# Patient Record
Sex: MALE | Race: AMERICAN INDIAN OR ALASKA NATIVE | NOT HISPANIC OR LATINO | Employment: FULL TIME | URBAN - METROPOLITAN AREA
[De-identification: names, ages, dates, MRNs, and addresses within clinical notes are randomized per-mention and may not be internally consistent; named-entity substitution may affect disease eponyms.]

---

## 2023-02-01 PROBLEM — E55.9 VITAMIN D INSUFFICIENCY: Status: ACTIVE | Noted: 2023-02-01

## 2023-02-01 PROBLEM — E29.1 HYPOGONADISM MALE: Status: ACTIVE | Noted: 2023-02-01

## 2023-02-01 PROBLEM — R69 TAKING MEDICATION FOR CHRONIC DISEASE: Status: ACTIVE | Noted: 2023-02-01

## 2023-02-01 PROBLEM — E78.5 DYSLIPIDEMIA: Status: ACTIVE | Noted: 2023-02-01

## 2023-02-01 PROBLEM — E11.9 DIABETES MELLITUS (MULTI): Status: ACTIVE | Noted: 2023-02-01

## 2023-02-01 PROBLEM — R10.11 ABDOMINAL PAIN, RUQ (RIGHT UPPER QUADRANT): Status: ACTIVE | Noted: 2023-02-01

## 2023-02-01 PROBLEM — L29.9 PRURITUS: Status: ACTIVE | Noted: 2023-02-01

## 2023-02-01 PROBLEM — R79.89 LOW TESTOSTERONE: Status: ACTIVE | Noted: 2023-02-01

## 2023-02-01 PROBLEM — R07.89 CHEST WALL PAIN: Status: ACTIVE | Noted: 2023-02-01

## 2023-02-01 PROBLEM — E66.9 OBESITY: Status: ACTIVE | Noted: 2023-02-01

## 2023-02-01 PROBLEM — E66.9 OBESITY (BMI 30-39.9): Status: ACTIVE | Noted: 2023-02-01

## 2023-02-01 PROBLEM — N52.9 MALE ERECTILE DISORDER: Status: ACTIVE | Noted: 2023-02-01

## 2023-02-01 PROBLEM — R21 LOCALIZED RASH: Status: ACTIVE | Noted: 2023-02-01

## 2023-02-01 RX ORDER — CHOLECALCIFEROL (VITAMIN D3) 25 MCG
1 TABLET ORAL DAILY
COMMUNITY
Start: 2022-09-01

## 2023-02-01 RX ORDER — TESTOSTERONE 10 MG/.5G
5 GEL, METERED TOPICAL EVERY MORNING
COMMUNITY
Start: 2020-01-24 | End: 2023-03-15 | Stop reason: SDUPTHER

## 2023-02-01 RX ORDER — MULTIVITAMIN
1 TABLET ORAL DAILY
COMMUNITY
Start: 2022-09-01

## 2023-02-01 RX ORDER — METFORMIN HYDROCHLORIDE 1000 MG/1
1000 TABLET ORAL 2 TIMES DAILY
COMMUNITY
Start: 2016-06-14 | End: 2023-04-21

## 2023-02-01 RX ORDER — LANOLIN ALCOHOL/MO/W.PET/CERES
1 CREAM (GRAM) TOPICAL DAILY
COMMUNITY
Start: 2022-09-01

## 2023-02-01 RX ORDER — INSULIN PUMP SYRINGE, 3 ML
1 EACH MISCELLANEOUS AS NEEDED
COMMUNITY

## 2023-02-01 RX ORDER — SILDENAFIL 100 MG/1
100 TABLET, FILM COATED ORAL
COMMUNITY
Start: 2021-11-03 | End: 2023-06-13 | Stop reason: SDUPTHER

## 2023-03-15 ENCOUNTER — OFFICE VISIT (OUTPATIENT)
Dept: PRIMARY CARE | Facility: CLINIC | Age: 58
End: 2023-03-15
Payer: COMMERCIAL

## 2023-03-15 VITALS
HEART RATE: 83 BPM | HEIGHT: 75 IN | BODY MASS INDEX: 32.16 KG/M2 | DIASTOLIC BLOOD PRESSURE: 68 MMHG | WEIGHT: 258.7 LBS | TEMPERATURE: 96.8 F | OXYGEN SATURATION: 99 % | SYSTOLIC BLOOD PRESSURE: 120 MMHG

## 2023-03-15 DIAGNOSIS — Z00.00 ROUTINE GENERAL MEDICAL EXAMINATION AT A HEALTH CARE FACILITY: ICD-10-CM

## 2023-03-15 DIAGNOSIS — E29.1 HYPOGONADISM IN MALE: Primary | ICD-10-CM

## 2023-03-15 DIAGNOSIS — E11.9 TYPE 2 DIABETES MELLITUS WITHOUT COMPLICATION, WITHOUT LONG-TERM CURRENT USE OF INSULIN (MULTI): ICD-10-CM

## 2023-03-15 LAB — HBA1C MFR BLD: 8.6 % (ref 4.2–6.5)

## 2023-03-15 PROCEDURE — 3078F DIAST BP <80 MM HG: CPT | Performed by: STUDENT IN AN ORGANIZED HEALTH CARE EDUCATION/TRAINING PROGRAM

## 2023-03-15 PROCEDURE — 1036F TOBACCO NON-USER: CPT | Performed by: STUDENT IN AN ORGANIZED HEALTH CARE EDUCATION/TRAINING PROGRAM

## 2023-03-15 PROCEDURE — 3074F SYST BP LT 130 MM HG: CPT | Performed by: STUDENT IN AN ORGANIZED HEALTH CARE EDUCATION/TRAINING PROGRAM

## 2023-03-15 PROCEDURE — 99396 PREV VISIT EST AGE 40-64: CPT | Performed by: STUDENT IN AN ORGANIZED HEALTH CARE EDUCATION/TRAINING PROGRAM

## 2023-03-15 PROCEDURE — 3052F HG A1C>EQUAL 8.0%<EQUAL 9.0%: CPT | Performed by: STUDENT IN AN ORGANIZED HEALTH CARE EDUCATION/TRAINING PROGRAM

## 2023-03-15 PROCEDURE — 83036 HEMOGLOBIN GLYCOSYLATED A1C: CPT | Performed by: STUDENT IN AN ORGANIZED HEALTH CARE EDUCATION/TRAINING PROGRAM

## 2023-03-15 RX ORDER — TESTOSTERONE 10 MG/.5G
5 GEL, METERED TOPICAL EVERY MORNING
Qty: 1 G | Refills: 3 | Status: SHIPPED | OUTPATIENT
Start: 2023-03-15 | End: 2023-06-13 | Stop reason: SDUPTHER

## 2023-03-15 ASSESSMENT — PATIENT HEALTH QUESTIONNAIRE - PHQ9
SUM OF ALL RESPONSES TO PHQ9 QUESTIONS 1 AND 2: 0
1. LITTLE INTEREST OR PLEASURE IN DOING THINGS: NOT AT ALL
2. FEELING DOWN, DEPRESSED OR HOPELESS: NOT AT ALL

## 2023-03-15 ASSESSMENT — ENCOUNTER SYMPTOMS
OCCASIONAL FEELINGS OF UNSTEADINESS: 0
DEPRESSION: 0

## 2023-03-15 NOTE — PROGRESS NOTES
"Subjective   Patient ID: Nato Callejas is a 57 y.o. male who presents for Diabetes (A1C check) and Hypogonadism (Needs refill of Testosterone).    HPI     DM; Type 2, currently maintained on Metformin. Last A1c was 6.1. Patient gets yearly eye exams. Patient does take home BS. Average BS are mid 100s. Denies any lows BS, he has been doing well with diet, started going to the gym, slipped on his diet and sugars went up     Low Testosterone: has low testosterone in the past, was noted to be 180 in the past, ED, fatigue, achiness, joint pains, kept declining in levels, was decided to start on testosterone and noticed a huge difference within 4 months. Denies urinary issues.         Social Hx : denies smoking, alcohol. or drugs, hvac  Colon cancer screening: negative   Review of Systems   All other systems reviewed and are negative.      Objective   /68 (BP Location: Right arm, Patient Position: Sitting)   Pulse 83   Temp 36 °C (96.8 °F) (Temporal)   Ht 1.905 m (6' 3\")   Wt 117 kg (258 lb 11.2 oz)   SpO2 99%   BMI 32.34 kg/m²     Physical Exam  Constitutional:       Appearance: Normal appearance.   HENT:      Head: Normocephalic and atraumatic.      Right Ear: Tympanic membrane and ear canal normal.      Left Ear: Tympanic membrane and ear canal normal.      Mouth/Throat:      Mouth: Mucous membranes are moist.      Pharynx: Oropharynx is clear.   Eyes:      Extraocular Movements: Extraocular movements intact.      Conjunctiva/sclera: Conjunctivae normal.      Pupils: Pupils are equal, round, and reactive to light.   Cardiovascular:      Rate and Rhythm: Normal rate and regular rhythm.      Pulses: Normal pulses.      Heart sounds: Normal heart sounds.   Pulmonary:      Effort: Pulmonary effort is normal.      Breath sounds: Normal breath sounds.   Abdominal:      General: Abdomen is flat. Bowel sounds are normal.      Palpations: Abdomen is soft.   Musculoskeletal:         General: Normal range of " motion.      Cervical back: Normal range of motion and neck supple.   Skin:     General: Skin is warm and dry.      Capillary Refill: Capillary refill takes 2 to 3 seconds.   Neurological:      General: No focal deficit present.      Mental Status: He is alert and oriented to person, place, and time. Mental status is at baseline.   Psychiatric:         Mood and Affect: Mood normal.         Behavior: Behavior normal.         Thought Content: Thought content normal.         Judgment: Judgment normal.         Assessment/Plan       Low testosterones: doing well on 5 pumps, stable, refill, last lab work reviewed, no contraindications     DM: A1c is 8.6 today, continue diet exercise doing well on metformin. He slipped with his diet and not making good diet choices, will need to increase jardiance, to help has glimperide has some unwanted side effect     I have personally reviewed the OARRS report for ANDREW GAMBOA. I have considered the risks of abuse, dependence, addiction and diversion.   TESTOSTERONE   Testosterone: continuation of therapy.   I attest the patient DOES NOT have: breast cancer, polycythemia (hematocrit > 54%), prostate cancer (Prostate-specific antigen > 4 ng per mL (4 mcg per L) or presence of nodules/induration on digital rectal examination (referral to a urologist is required before considering testosterone therapy).   Activities of Daily Living:   Overall functioning: Better

## 2023-04-19 DIAGNOSIS — E11.65 TYPE 2 DIABETES MELLITUS WITH HYPERGLYCEMIA (MULTI): ICD-10-CM

## 2023-04-19 DIAGNOSIS — E11.9 TYPE 2 DIABETES MELLITUS WITHOUT COMPLICATION, WITHOUT LONG-TERM CURRENT USE OF INSULIN (MULTI): ICD-10-CM

## 2023-04-21 RX ORDER — METFORMIN HYDROCHLORIDE 1000 MG/1
TABLET ORAL
Qty: 180 TABLET | Refills: 2 | Status: SHIPPED | OUTPATIENT
Start: 2023-04-21 | End: 2023-06-13 | Stop reason: SDUPTHER

## 2023-04-21 RX ORDER — EMPAGLIFLOZIN 25 MG/1
TABLET, FILM COATED ORAL
Qty: 30 TABLET | Refills: 2 | Status: SHIPPED | OUTPATIENT
Start: 2023-04-21 | End: 2023-06-13 | Stop reason: SDUPTHER

## 2023-06-13 ENCOUNTER — OFFICE VISIT (OUTPATIENT)
Dept: PRIMARY CARE | Facility: CLINIC | Age: 58
End: 2023-06-13
Payer: COMMERCIAL

## 2023-06-13 VITALS
BODY MASS INDEX: 31.93 KG/M2 | HEIGHT: 75 IN | SYSTOLIC BLOOD PRESSURE: 122 MMHG | TEMPERATURE: 96.8 F | WEIGHT: 256.8 LBS | OXYGEN SATURATION: 99 % | DIASTOLIC BLOOD PRESSURE: 78 MMHG | HEART RATE: 71 BPM

## 2023-06-13 DIAGNOSIS — E29.1 HYPOGONADISM IN MALE: ICD-10-CM

## 2023-06-13 DIAGNOSIS — E11.9 TYPE 2 DIABETES MELLITUS WITHOUT COMPLICATION, WITHOUT LONG-TERM CURRENT USE OF INSULIN (MULTI): ICD-10-CM

## 2023-06-13 DIAGNOSIS — E11.65 TYPE 2 DIABETES MELLITUS WITH HYPERGLYCEMIA (MULTI): ICD-10-CM

## 2023-06-13 DIAGNOSIS — E11.9 TYPE 2 DIABETES MELLITUS WITHOUT COMPLICATION, UNSPECIFIED WHETHER LONG TERM INSULIN USE (MULTI): ICD-10-CM

## 2023-06-13 DIAGNOSIS — R79.89 LOW TESTOSTERONE: Primary | ICD-10-CM

## 2023-06-13 LAB
BASOPHILS (10*3/UL) IN BLOOD BY AUTOMATED COUNT: 0.02 X10E9/L (ref 0–0.1)
BASOPHILS/100 LEUKOCYTES IN BLOOD BY AUTOMATED COUNT: 0.4 % (ref 0–2)
EOSINOPHILS (10*3/UL) IN BLOOD BY AUTOMATED COUNT: 0.06 X10E9/L (ref 0–0.7)
EOSINOPHILS/100 LEUKOCYTES IN BLOOD BY AUTOMATED COUNT: 1.2 % (ref 0–6)
ERYTHROCYTE DISTRIBUTION WIDTH (RATIO) BY AUTOMATED COUNT: 12.5 % (ref 11.5–14.5)
ERYTHROCYTE MEAN CORPUSCULAR HEMOGLOBIN CONCENTRATION (G/DL) BY AUTOMATED: 32.7 G/DL (ref 32–36)
ERYTHROCYTE MEAN CORPUSCULAR VOLUME (FL) BY AUTOMATED COUNT: 96 FL (ref 80–100)
ERYTHROCYTES (10*6/UL) IN BLOOD BY AUTOMATED COUNT: 5.18 X10E12/L (ref 4.5–5.9)
HBA1C MFR BLD: 7.9 % (ref 4.2–6.5)
HEMATOCRIT (%) IN BLOOD BY AUTOMATED COUNT: 49.6 % (ref 41–52)
HEMOGLOBIN (G/DL) IN BLOOD: 16.2 G/DL (ref 13.5–17.5)
IMMATURE GRANULOCYTES/100 LEUKOCYTES IN BLOOD BY AUTOMATED COUNT: 0.4 % (ref 0–0.9)
LEUKOCYTES (10*3/UL) IN BLOOD BY AUTOMATED COUNT: 5 X10E9/L (ref 4.4–11.3)
LYMPHOCYTES (10*3/UL) IN BLOOD BY AUTOMATED COUNT: 1.32 X10E9/L (ref 1.2–4.8)
LYMPHOCYTES/100 LEUKOCYTES IN BLOOD BY AUTOMATED COUNT: 26.7 % (ref 13–44)
MONOCYTES (10*3/UL) IN BLOOD BY AUTOMATED COUNT: 0.35 X10E9/L (ref 0.1–1)
MONOCYTES/100 LEUKOCYTES IN BLOOD BY AUTOMATED COUNT: 7.1 % (ref 2–10)
NEUTROPHILS (10*3/UL) IN BLOOD BY AUTOMATED COUNT: 3.18 X10E9/L (ref 1.2–7.7)
NEUTROPHILS/100 LEUKOCYTES IN BLOOD BY AUTOMATED COUNT: 64.2 % (ref 40–80)
NRBC (PER 100 WBCS) BY AUTOMATED COUNT: 0 /100 WBC (ref 0–0)
PLATELETS (10*3/UL) IN BLOOD AUTOMATED COUNT: 199 X10E9/L (ref 150–450)
PROSTATE SPECIFIC ANTIGEN,SCREEN: 2.01 NG/ML (ref 0–4)
TESTOSTERONE (NG/DL) IN SER/PLAS: 166 NG/DL (ref 240–1000)

## 2023-06-13 PROCEDURE — 84153 ASSAY OF PSA TOTAL: CPT

## 2023-06-13 PROCEDURE — 1036F TOBACCO NON-USER: CPT | Performed by: STUDENT IN AN ORGANIZED HEALTH CARE EDUCATION/TRAINING PROGRAM

## 2023-06-13 PROCEDURE — 3074F SYST BP LT 130 MM HG: CPT | Performed by: STUDENT IN AN ORGANIZED HEALTH CARE EDUCATION/TRAINING PROGRAM

## 2023-06-13 PROCEDURE — 3078F DIAST BP <80 MM HG: CPT | Performed by: STUDENT IN AN ORGANIZED HEALTH CARE EDUCATION/TRAINING PROGRAM

## 2023-06-13 PROCEDURE — 83036 HEMOGLOBIN GLYCOSYLATED A1C: CPT | Performed by: STUDENT IN AN ORGANIZED HEALTH CARE EDUCATION/TRAINING PROGRAM

## 2023-06-13 PROCEDURE — 85025 COMPLETE CBC W/AUTO DIFF WBC: CPT

## 2023-06-13 PROCEDURE — 3051F HG A1C>EQUAL 7.0%<8.0%: CPT | Performed by: STUDENT IN AN ORGANIZED HEALTH CARE EDUCATION/TRAINING PROGRAM

## 2023-06-13 PROCEDURE — 99214 OFFICE O/P EST MOD 30 MIN: CPT | Performed by: STUDENT IN AN ORGANIZED HEALTH CARE EDUCATION/TRAINING PROGRAM

## 2023-06-13 PROCEDURE — 84403 ASSAY OF TOTAL TESTOSTERONE: CPT

## 2023-06-13 RX ORDER — SILDENAFIL 100 MG/1
100 TABLET, FILM COATED ORAL
Qty: 10 TABLET | Refills: 3 | Status: SHIPPED | OUTPATIENT
Start: 2023-06-13

## 2023-06-13 RX ORDER — METFORMIN HYDROCHLORIDE 1000 MG/1
1000 TABLET ORAL 2 TIMES DAILY
Qty: 180 TABLET | Refills: 2 | Status: SHIPPED | OUTPATIENT
Start: 2023-06-13 | End: 2024-01-23 | Stop reason: SDUPTHER

## 2023-06-13 RX ORDER — TESTOSTERONE 10 MG/.5G
5 GEL, METERED TOPICAL EVERY MORNING
Qty: 1 G | Refills: 3 | Status: SHIPPED | OUTPATIENT
Start: 2023-06-13 | End: 2023-12-18

## 2023-06-13 ASSESSMENT — ENCOUNTER SYMPTOMS
LOSS OF SENSATION IN FEET: 0
DEPRESSION: 0
OCCASIONAL FEELINGS OF UNSTEADINESS: 0

## 2023-06-13 ASSESSMENT — PATIENT HEALTH QUESTIONNAIRE - PHQ9
1. LITTLE INTEREST OR PLEASURE IN DOING THINGS: NOT AT ALL
2. FEELING DOWN, DEPRESSED OR HOPELESS: NOT AT ALL
SUM OF ALL RESPONSES TO PHQ9 QUESTIONS 1 AND 2: 0

## 2023-06-13 NOTE — PROGRESS NOTES
Subjective   Patient ID: Nato Callejas is a 57 y.o. male who presents for Diabetes, Hypogonadism (Refill of Sildenafil), and Sciatica (Radiating down right leg).    HPI     DM; Type 2, currently maintained on Metformin. Last A1c was 6.1. Patient gets yearly eye exams. Patient does take home BS. Average BS are mid 100s. Denies any lows BS, he has been doing well with diet, started going to the gym, slipped on his diet and sugars went up     Low Testosterone: has low testosterone in the past, was noted to be 180 in the past, ED, fatigue, achiness, joint pains, kept declining, was decided to start on testosterone and noticed a huge difference within 4 months. Denies urinary issues.      OARRS:  No data recorded  I have personally reviewed the OARRS report for Nato Callejas. I have considered the risks of abuse, dependence, addiction and diversion    Is the patient prescribed a combination of a benzodiazepine and opioid?  No    Last Urine Drug Screen / ordered today: No  Recent Results (from the past 63741 hour(s))   OPIATE/OPIOID/BENZO PRESCRIPTION COMPLIANCE    Collection Time: 04/15/21  8:49 AM   Result Value Ref Range    DRUG SCREEN COMMENT URINE SEE BELOW     Creatine, Urine 226.1 mg/dL    Amphetamine Screen, Urine PRESUMPTIVE NEGATIVE NEGATIVE    Barbiturate Screen, Urine PRESUMPTIVE NEGATIVE NEGATIVE    Cannabinoid Screen, Urine PRESUMPTIVE NEGATIVE NEGATIVE    Cocaine Screen, Urine PRESUMPTIVE NEGATIVE NEGATIVE    PCP Screen, Urine PRESUMPTIVE NEGATIVE NEGATIVE    7-Aminoclonazepam <25 Cutoff <25 ng/mL    Alpha-Hydroxyalprazolam <25 Cutoff <25 ng/mL    Alpha-Hydroxymidazolam <25 Cutoff <25 ng/mL    Alprazolam <25 Cutoff <25 ng/mL    Chlordiazepoxide <25 Cutoff <25 ng/mL    Clonazepam <25 Cutoff <25 ng/mL    Diazepam <25 Cutoff <25 ng/mL    Lorazepam <25 Cutoff <25 ng/mL    Midazolam <25 Cutoff <25 ng/mL    Nordiazepam <25 Cutoff <25 ng/mL    Oxazepam <25 Cutoff <25 ng/mL    Temazepam <25 Cutoff <25 ng/mL     Zolpidem <25 Cutoff <25 ng/mL    Zolpidem Metabolite (ZCA) <25 Cutoff <25 ng/mL    6-Acetylmorphine <25 Cutoff <25 ng/mL    Codeine <50 Cutoff <50 ng/mL    Hydrocodone <25 Cutoff <25 ng/mL    Hydromorphone <25 Cutoff <25 ng/mL    Morphine Urine <50 Cutoff <50 ng/mL    Norhydrocodone <25 Cutoff <25 ng/mL    Noroxycodone <25 Cutoff <25 ng/mL    Oxycodone <25 Cutoff <25 ng/mL    Oxymorphone <25 Cutoff <25 ng/mL    Tramadol <50 Cutoff <50 ng/mL    O-Desmethyltramadol <50 Cutoff <50 ng/mL    Fentanyl <2.5 Cutoff<2.5 ng/mL    Norfentanyl <2.5 Cutoff<2.5 ng/mL    METHADONE CONFIRMATION,URINE <25 Cutoff <25 ng/mL    EDDP <25 Cutoff <25 ng/mL     Results are as expected.     Controlled Substance Agreement:  Date of the Last Agreement: 3/1/23  Reviewed Controlled Substance Agreement including but not limited to the benefits, risks, and alternatives to treatment with a Controlled Substance medication(s).    Testosterone:  What is the patient's goal of therapy? Energy, low t  Is this being achieved with current treatment? yes    I attest the patient does not have: Breast Cancer, Polycythemia, or Prostate Cancer    Last Testosterone check:  Testosterone, Free   Date Value Ref Range Status   12/09/2022 52.8 35.0 - 155.0 pg/mL Final     Comment:     This test was developed and its analytical performance  characteristics have been determined by [facility] [place], [place]. It has  not been cleared or approved by the U.S. Food and Drug  Administration. This assay has been validated pursuant  to the CLIA regulations and is used for clinical  purposes.       Testosterone   Date Value Ref Range Status   03/01/2022 443 240 - 1,000 ng/dL Final     Comment:      Nandrolone decanoate, 11 Beta-hydroxytestosterone, androstenedione,   testosterone propionate and 11-keto-testosterone strongly cross    react with this test method.    Biotin interference may cause falsely elevated results. Patients   taking a Biotin  "dose of up to 5 mg/day should refrain from taking   Biotin for 24 hours before sample collection. Providers may contact   their local laboratory for further information.       Testosterone, Total, LC-MS/MS   Date Value Ref Range Status   12/09/2022 264 250 - 1,100 ng/dL Final     Comment:     For additional information, please refer to  http://education.[employer]/faq/  [id number]  (This link is being provided for informational/  educational purposes only.)     This test was developed and its analytical performance  characteristics have been determined by [facility], [place]. It has  not been cleared or approved by the U.S. Food and Drug  Administration. This assay has been validated pursuant  to the CLIA regulations and is used for clinical  purposes.         Last CBC:    No results found for: \"CBCDIF\", \"BMCBC\", \"PR1\"    Last PSA:   PSA   Date Value Ref Range Status   12/09/2022 1.28 0.00 - 4.00 ng/mL Final     Comment:     The FDA requires that the method used for PSA assay be   reported to the physician. Values obtained with different   assay methods must not be used interchangeably. This test   was performed at [facility] using the Siemens  [employer] PSA method, which is a sandwich immunoassay using   chemiluminescence for quantitation. The assay is approved  for measurement of prostate-specific antigen (PSA) in   serum and may be used in conjunction with a digital rectal  examination in men 50 years and older as an aid in   detection of prostate cancer.   5-Alpha-reductase inhibitors (e.g. Proscar, Finasteride,   Avodart, Dutasteride and Trudy) for the treatment of BPH   have been shown to lower PSA levels by an average of 50%   after 6 months of treatment.       Prostate Specific Antigen,Screen   Date Value Ref Range Status   03/30/2021 1.20 0.00 - 4.00 ng/mL Final     Comment:     The FDA requires that the method used for PSA assay be " "  reported to the physician. Values obtained with different   assay methods must not be used interchangeably. This test   was performed at [facility] using the Siemens  [employer] PSA method, which is a sandwich immunoassay using   chemiluminescence for quantitation. The assay is approved  for measurement of prostate-specific antigen (PSA) in   serum and may be used in conjunction with a digital rectal  examination in men 50 years and older as an aid in   detection of prostate cancer.   5-Alpha-reductase inhibitors (e.g. Proscar, Finasteride,   Avodart, Dutasteride and Trudy) for the treatment of BPH   have been shown to lower PSA levels by an average of 50%   after 6 months of treatment.         Activities of Daily Living:   Is your overall impression that this patient is benefiting (symptom reduction outweighs side effects) from testosterone therapy? Yes     1. Physical Functioning: Better  2. Family Relationship: Better  3. Social Relationship: Better  4. Mood: Better  5. Sleep Patterns: Better  6. Overall Function: Better     Social Hx : denies smoking, alcohol. or drugs, hvac  Colon cancer screening: negative     Review of Systems   All other systems reviewed and are negative.      Objective   /78 (BP Location: Right arm, Patient Position: Sitting)   Pulse 71   Temp 36 °C (96.8 °F) (Temporal)   Ht 1.905 m (6' 3\")   Wt 116 kg (256 lb 12.8 oz)   SpO2 99%   BMI 32.10 kg/m²     Physical Exam  Constitutional:       Appearance: Normal appearance.   HENT:      Head: Normocephalic and atraumatic.      Right Ear: Tympanic membrane and ear canal normal.      Left Ear: Tympanic membrane and ear canal normal.      Mouth/Throat:      Mouth: Mucous membranes are moist.      Pharynx: Oropharynx is clear.   Eyes:      Extraocular Movements: Extraocular movements intact.      Conjunctiva/sclera: Conjunctivae normal.      Pupils: Pupils are equal, round, and reactive to light.   Cardiovascular:      " Rate and Rhythm: Normal rate and regular rhythm.      Pulses: Normal pulses.      Heart sounds: Normal heart sounds.   Pulmonary:      Effort: Pulmonary effort is normal.      Breath sounds: Normal breath sounds.   Abdominal:      General: Abdomen is flat. Bowel sounds are normal.      Palpations: Abdomen is soft.   Musculoskeletal:         General: Normal range of motion.      Cervical back: Normal range of motion and neck supple.   Skin:     General: Skin is warm and dry.      Capillary Refill: Capillary refill takes 2 to 3 seconds.   Neurological:      General: No focal deficit present.      Mental Status: He is alert and oriented to person, place, and time. Mental status is at baseline.   Psychiatric:         Mood and Affect: Mood normal.         Behavior: Behavior normal.         Thought Content: Thought content normal.         Judgment: Judgment normal.         Assessment/Plan     1. Type 2 diabetes mellitus without complication, unspecified whether long term insulin use (CMS/[facility])    - POCT Glycosylated Hemoglobin (HGB A1C) docked device    2. Low testosterone    - CBC and Auto Differential  - Testosterone  - Prostate Specific Antigen, Screen    3. Hypogonadism in male    - sildenafil (Viagra) 100 mg tablet; Take 1 tablet (100 mg) by mouth once daily.  Dispense: 10 tablet; Refill: 3  - testosterone (Fortesta) 10 mg/0.5 gram /actuation gel in metered-dose pump gel; Place 5 Pump on the skin once daily in the morning.  Dispense: 1 g; Refill: 3    4. Type 2 diabetes mellitus without complication, without long-term current use of insulin (CMS/[facility])  - meforming  - A1c is bhavesh,r continue diet control  - empagliflozin (Jardiance) 25 mg; Take 1 tablet (25 mg) by mouth once daily.  Dispense: 90 tablet; Refill: 1

## 2023-09-19 ENCOUNTER — OFFICE VISIT (OUTPATIENT)
Dept: PRIMARY CARE | Facility: CLINIC | Age: 58
End: 2023-09-19
Payer: COMMERCIAL

## 2023-09-19 VITALS
BODY MASS INDEX: 32.95 KG/M2 | OXYGEN SATURATION: 98 % | WEIGHT: 265 LBS | SYSTOLIC BLOOD PRESSURE: 112 MMHG | HEIGHT: 75 IN | HEART RATE: 90 BPM | DIASTOLIC BLOOD PRESSURE: 78 MMHG

## 2023-09-19 DIAGNOSIS — E11.9 TYPE 2 DIABETES MELLITUS WITHOUT COMPLICATION, WITH LONG-TERM CURRENT USE OF INSULIN (MULTI): ICD-10-CM

## 2023-09-19 DIAGNOSIS — E29.1 HYPOGONADISM IN MALE: Primary | ICD-10-CM

## 2023-09-19 DIAGNOSIS — Z79.4 TYPE 2 DIABETES MELLITUS WITHOUT COMPLICATION, WITH LONG-TERM CURRENT USE OF INSULIN (MULTI): ICD-10-CM

## 2023-09-19 LAB — HBA1C MFR BLD: 7.1 % (ref 4.2–6.5)

## 2023-09-19 PROCEDURE — 3051F HG A1C>EQUAL 7.0%<8.0%: CPT | Performed by: STUDENT IN AN ORGANIZED HEALTH CARE EDUCATION/TRAINING PROGRAM

## 2023-09-19 PROCEDURE — 99213 OFFICE O/P EST LOW 20 MIN: CPT | Performed by: STUDENT IN AN ORGANIZED HEALTH CARE EDUCATION/TRAINING PROGRAM

## 2023-09-19 PROCEDURE — 83036 HEMOGLOBIN GLYCOSYLATED A1C: CPT | Mod: CLIA WAIVED TEST | Performed by: STUDENT IN AN ORGANIZED HEALTH CARE EDUCATION/TRAINING PROGRAM

## 2023-09-19 PROCEDURE — 3074F SYST BP LT 130 MM HG: CPT | Performed by: STUDENT IN AN ORGANIZED HEALTH CARE EDUCATION/TRAINING PROGRAM

## 2023-09-19 PROCEDURE — 3078F DIAST BP <80 MM HG: CPT | Performed by: STUDENT IN AN ORGANIZED HEALTH CARE EDUCATION/TRAINING PROGRAM

## 2023-09-19 PROCEDURE — 1036F TOBACCO NON-USER: CPT | Performed by: STUDENT IN AN ORGANIZED HEALTH CARE EDUCATION/TRAINING PROGRAM

## 2023-09-19 ASSESSMENT — PATIENT HEALTH QUESTIONNAIRE - PHQ9
1. LITTLE INTEREST OR PLEASURE IN DOING THINGS: NOT AT ALL
SUM OF ALL RESPONSES TO PHQ9 QUESTIONS 1 AND 2: 0
2. FEELING DOWN, DEPRESSED OR HOPELESS: NOT AT ALL

## 2023-09-19 ASSESSMENT — ENCOUNTER SYMPTOMS
DEPRESSION: 0
LOSS OF SENSATION IN FEET: 0
OCCASIONAL FEELINGS OF UNSTEADINESS: 0

## 2023-09-19 NOTE — PROGRESS NOTES
"Subjective   Patient ID: Nato Callejas is a 57 y.o. male who presents for Hypogonadism, Diabetes, and Med Refill (Metformin, Testosterone).    HPI     DM; Type 2, currently maintained on Metformin. Last A1c was 6.1. Patient gets yearly eye exams. Patient does take home BS. Average BS are mid 100s. Denies any lows BS, he has been doing well with diet, started going to the gym, doing well     Low Testosterone: has low testosterone in the past, was noted to be 180 in the past, ED, fatigue, achiness, joint pains, kept declining, was decided to start on testosterone and noticed a huge difference within 4 months. Denies urinary issues.     Review of Systems   All other systems reviewed and are negative.      Objective   /78 (BP Location: Right arm, Patient Position: Sitting)   Pulse 90   Ht 1.905 m (6' 3\")   Wt 120 kg (265 lb)   SpO2 98%   BMI 33.12 kg/m²     Physical Exam  Constitutional:       Appearance: Normal appearance.   HENT:      Head: Normocephalic and atraumatic.      Right Ear: Tympanic membrane and ear canal normal.      Left Ear: Tympanic membrane and ear canal normal.      Mouth/Throat:      Mouth: Mucous membranes are moist.      Pharynx: Oropharynx is clear.   Eyes:      Extraocular Movements: Extraocular movements intact.      Conjunctiva/sclera: Conjunctivae normal.      Pupils: Pupils are equal, round, and reactive to light.   Cardiovascular:      Rate and Rhythm: Normal rate and regular rhythm.      Pulses: Normal pulses.      Heart sounds: Normal heart sounds.   Pulmonary:      Effort: Pulmonary effort is normal.      Breath sounds: Normal breath sounds.   Abdominal:      General: Abdomen is flat. Bowel sounds are normal.      Palpations: Abdomen is soft.   Musculoskeletal:         General: Normal range of motion.      Cervical back: Normal range of motion and neck supple.   Skin:     General: Skin is warm and dry.      Capillary Refill: Capillary refill takes 2 to 3 seconds. "   Neurological:      General: No focal deficit present.      Mental Status: He is alert and oriented to person, place, and time. Mental status is at baseline.   Psychiatric:         Mood and Affect: Mood normal.         Behavior: Behavior normal.         Thought Content: Thought content normal.         Judgment: Judgment normal.         Assessment/Plan   1. Type 2 diabetes mellitus without complication, with long-term current use of insulin (CMS/[facility])  - 7.1 A1c, doing well continue meds  - POCT Glycosylated Hemoglobin (HGB A1C) docked device    2. Hypogonadism in male  Stable doing well  Continue Testerone

## 2023-11-03 DIAGNOSIS — E11.9 TYPE 2 DIABETES MELLITUS WITHOUT COMPLICATION, WITHOUT LONG-TERM CURRENT USE OF INSULIN (MULTI): ICD-10-CM

## 2023-11-06 RX ORDER — DICLOFENAC SODIUM 50 MG/1
TABLET, DELAYED RELEASE ORAL
Qty: 30 TABLET | Refills: 2 | Status: SHIPPED | OUTPATIENT
Start: 2023-11-06

## 2023-12-17 DIAGNOSIS — E11.9 TYPE 2 DIABETES MELLITUS WITHOUT COMPLICATION, WITHOUT LONG-TERM CURRENT USE OF INSULIN (MULTI): ICD-10-CM

## 2023-12-17 DIAGNOSIS — E29.1 HYPOGONADISM IN MALE: ICD-10-CM

## 2023-12-18 RX ORDER — TESTOSTERONE 10 MG/.5G
5 GEL, METERED TOPICAL EVERY MORNING
Qty: 60 G | Refills: 3 | Status: SHIPPED | OUTPATIENT
Start: 2023-12-18 | End: 2024-03-20 | Stop reason: SDUPTHER

## 2024-01-23 ENCOUNTER — OFFICE VISIT (OUTPATIENT)
Dept: PRIMARY CARE | Facility: CLINIC | Age: 59
End: 2024-01-23
Payer: COMMERCIAL

## 2024-01-23 VITALS
HEIGHT: 75 IN | HEART RATE: 90 BPM | SYSTOLIC BLOOD PRESSURE: 118 MMHG | OXYGEN SATURATION: 97 % | WEIGHT: 265 LBS | DIASTOLIC BLOOD PRESSURE: 82 MMHG | BODY MASS INDEX: 32.95 KG/M2

## 2024-01-23 DIAGNOSIS — E78.5 DYSLIPIDEMIA: ICD-10-CM

## 2024-01-23 DIAGNOSIS — Z79.4 TYPE 2 DIABETES MELLITUS WITHOUT COMPLICATION, WITH LONG-TERM CURRENT USE OF INSULIN (MULTI): ICD-10-CM

## 2024-01-23 DIAGNOSIS — E29.1 HYPOGONADISM IN MALE: Primary | ICD-10-CM

## 2024-01-23 DIAGNOSIS — E11.9 TYPE 2 DIABETES MELLITUS WITHOUT COMPLICATION, WITH LONG-TERM CURRENT USE OF INSULIN (MULTI): ICD-10-CM

## 2024-01-23 DIAGNOSIS — E11.65 TYPE 2 DIABETES MELLITUS WITH HYPERGLYCEMIA (MULTI): ICD-10-CM

## 2024-01-23 LAB
ALBUMIN SERPL BCP-MCNC: 4.6 G/DL (ref 3.4–5)
ALP SERPL-CCNC: 39 U/L (ref 33–120)
ALT SERPL W P-5'-P-CCNC: 41 U/L (ref 10–52)
ANION GAP SERPL CALC-SCNC: 19 MMOL/L (ref 10–20)
AST SERPL W P-5'-P-CCNC: 30 U/L (ref 9–39)
BASOPHILS # BLD AUTO: 0.02 X10*3/UL (ref 0–0.1)
BASOPHILS NFR BLD AUTO: 0.4 %
BILIRUB SERPL-MCNC: 0.6 MG/DL (ref 0–1.2)
BUN SERPL-MCNC: 16 MG/DL (ref 6–23)
CALCIUM SERPL-MCNC: 9.4 MG/DL (ref 8.6–10.6)
CHLORIDE SERPL-SCNC: 101 MMOL/L (ref 98–107)
CO2 SERPL-SCNC: 23 MMOL/L (ref 21–32)
CREAT SERPL-MCNC: 0.89 MG/DL (ref 0.5–1.3)
EGFRCR SERPLBLD CKD-EPI 2021: >90 ML/MIN/1.73M*2
EOSINOPHIL # BLD AUTO: 0.11 X10*3/UL (ref 0–0.7)
EOSINOPHIL NFR BLD AUTO: 1.9 %
ERYTHROCYTE [DISTWIDTH] IN BLOOD BY AUTOMATED COUNT: 12.4 % (ref 11.5–14.5)
GLUCOSE SERPL-MCNC: 159 MG/DL (ref 74–99)
HBA1C MFR BLD: 9.1 % (ref 4.2–6.5)
HCT VFR BLD AUTO: 47.9 % (ref 41–52)
HGB BLD-MCNC: 15.9 G/DL (ref 13.5–17.5)
IMM GRANULOCYTES # BLD AUTO: 0.04 X10*3/UL (ref 0–0.7)
IMM GRANULOCYTES NFR BLD AUTO: 0.7 % (ref 0–0.9)
LYMPHOCYTES # BLD AUTO: 1.61 X10*3/UL (ref 1.2–4.8)
LYMPHOCYTES NFR BLD AUTO: 28.2 %
MCH RBC QN AUTO: 31 PG (ref 26–34)
MCHC RBC AUTO-ENTMCNC: 33.2 G/DL (ref 32–36)
MCV RBC AUTO: 93 FL (ref 80–100)
MONOCYTES # BLD AUTO: 0.36 X10*3/UL (ref 0.1–1)
MONOCYTES NFR BLD AUTO: 6.3 %
NEUTROPHILS # BLD AUTO: 3.56 X10*3/UL (ref 1.2–7.7)
NEUTROPHILS NFR BLD AUTO: 62.5 %
NRBC BLD-RTO: 0 /100 WBCS (ref 0–0)
PLATELET # BLD AUTO: 231 X10*3/UL (ref 150–450)
POTASSIUM SERPL-SCNC: 5.2 MMOL/L (ref 3.5–5.3)
PROT SERPL-MCNC: 7.2 G/DL (ref 6.4–8.2)
PSA SERPL-MCNC: 1.8 NG/ML
RBC # BLD AUTO: 5.13 X10*6/UL (ref 4.5–5.9)
SODIUM SERPL-SCNC: 138 MMOL/L (ref 136–145)
TESTOST SERPL-MCNC: 135 NG/DL (ref 240–1000)
WBC # BLD AUTO: 5.7 X10*3/UL (ref 4.4–11.3)

## 2024-01-23 PROCEDURE — 99214 OFFICE O/P EST MOD 30 MIN: CPT | Performed by: STUDENT IN AN ORGANIZED HEALTH CARE EDUCATION/TRAINING PROGRAM

## 2024-01-23 PROCEDURE — 84403 ASSAY OF TOTAL TESTOSTERONE: CPT

## 2024-01-23 PROCEDURE — 3046F HEMOGLOBIN A1C LEVEL >9.0%: CPT | Performed by: STUDENT IN AN ORGANIZED HEALTH CARE EDUCATION/TRAINING PROGRAM

## 2024-01-23 PROCEDURE — 3074F SYST BP LT 130 MM HG: CPT | Performed by: STUDENT IN AN ORGANIZED HEALTH CARE EDUCATION/TRAINING PROGRAM

## 2024-01-23 PROCEDURE — 36415 COLL VENOUS BLD VENIPUNCTURE: CPT

## 2024-01-23 PROCEDURE — 85025 COMPLETE CBC W/AUTO DIFF WBC: CPT

## 2024-01-23 PROCEDURE — 1036F TOBACCO NON-USER: CPT | Performed by: STUDENT IN AN ORGANIZED HEALTH CARE EDUCATION/TRAINING PROGRAM

## 2024-01-23 PROCEDURE — 84153 ASSAY OF PSA TOTAL: CPT

## 2024-01-23 PROCEDURE — 3079F DIAST BP 80-89 MM HG: CPT | Performed by: STUDENT IN AN ORGANIZED HEALTH CARE EDUCATION/TRAINING PROGRAM

## 2024-01-23 PROCEDURE — 80053 COMPREHEN METABOLIC PANEL: CPT

## 2024-01-23 PROCEDURE — 83036 HEMOGLOBIN GLYCOSYLATED A1C: CPT | Mod: CLIA WAIVED TEST | Performed by: STUDENT IN AN ORGANIZED HEALTH CARE EDUCATION/TRAINING PROGRAM

## 2024-01-23 RX ORDER — METFORMIN HYDROCHLORIDE 1000 MG/1
1000 TABLET ORAL 2 TIMES DAILY
Qty: 180 TABLET | Refills: 2 | Status: SHIPPED | OUTPATIENT
Start: 2024-01-23 | End: 2024-04-23 | Stop reason: SDUPTHER

## 2024-01-23 NOTE — PROGRESS NOTES
Subjective   Patient ID: Nato Callejas is a 58 y.o. male who presents for Follow-up (Diabetes ).    HPI     DM; Type 2, currently maintained on Metformin. Last A1c was 6.1. Patient gets yearly eye exams. Patient does take home BS. Average BS are mid 100s. Denies any lows BS, he has been doing well with diet, started going to the gym, doing well     Low Testosterone: has low testosterone in the past, was noted to be 180 in the past, ED, fatigue, achiness, joint pains, kept declining, was decided to start on testosterone and noticed a huge difference within 4 months. Denies urinary issues.     OARRS:  No data recorded  I have personally reviewed the OARRS report for Nato Callejas. I have considered the risks of abuse, dependence, addiction and diversion    Is the patient prescribed a combination of a benzodiazepine and opioid?  No    Last Urine Drug Screen / ordered today: Yes  No results found for this or any previous visit (from the past 8760 hour(s)).  Results are as expected.           Controlled Substance Agreement:  Date of the Last Agreement: 1/23/24  Reviewed Controlled Substance Agreement including but not limited to the benefits, risks, and alternatives to treatment with a Controlled Substance medication(s).    Testosterone:  What is the patient's goal of therapy? lowt  Is this being achieved with current treatment? yes    I attest the patient does not have: Breast Cancer, Polycythemia, or Prostate Cancer    Last Testosterone check:  Testosterone, Free   Date Value Ref Range Status   12/09/2022 52.8 35.0 - 155.0 pg/mL Final     Comment:     This test was developed and its analytical performance  characteristics have been determined by [facility] [place], [place]. It has  not been cleared or approved by the U.S. Food and Drug  Administration. This assay has been validated pursuant  to the CLIA regulations and is used for clinical  purposes.       Testosterone   Date Value Ref  "Range Status   06/13/2023 166 (L) 240 - 1000 ng/dL Final     Comment:      Nandrolone decanoate, 11 Beta-hydroxytestosterone, androstenedione,   testosterone propionate and 11-keto-testosterone strongly cross    react with this test method.    Biotin interference may cause falsely elevated results. Patients   taking a Biotin dose of up to 5 mg/day should refrain from taking   Biotin for 24 hours before sample collection. Providers may contact   their local laboratory for further information.     Testosterone, Total, LC-MS/MS   Date Value Ref Range Status   12/09/2022 264 250 - 1,100 ng/dL Final     Comment:     For additional information, please refer to  http://education.[employer]/faq/  [id number]  (This link is being provided for informational/  educational purposes only.)     This test was developed and its analytical performance  characteristics have been determined by [facility], [place]. It has  not been cleared or approved by the U.S. Food and Drug  Administration. This assay has been validated pursuant  to the CLIA regulations and is used for clinical  purposes.         Last CBC:    No results found for: \"CBCDIF\", \"BMCBC\", \"PR1\"    Last PSA:   PSA   Date Value Ref Range Status   12/09/2022 1.28 0.00 - 4.00 ng/mL Final     Comment:     The FDA requires that the method used for PSA assay be   reported to the physician. Values obtained with different   assay methods must not be used interchangeably. This test   was performed at [facility] using the Siemens  [employer] PSA method, which is a sandwich immunoassay using   chemiluminescence for quantitation. The assay is approved  for measurement of prostate-specific antigen (PSA) in   serum and may be used in conjunction with a digital rectal  examination in men 50 years and older as an aid in   detection of prostate cancer.   5-Alpha-reductase inhibitors (e.g. Proscar, Finasteride, " "  Avodart, Dutasteride and Trudy) for the treatment of BPH   have been shown to lower PSA levels by an average of 50%   after 6 months of treatment.       Prostate Specific Antigen,Screen   Date Value Ref Range Status   06/13/2023 2.01 0.00 - 4.00 ng/mL Final     Comment:     The FDA requires that the method used for PSA assay be   reported to the physician. Values obtained with different   assay methods must not be used interchangeably. This test   was performed at [facility] using the Siemens  [employer] PSA method, which is a sandwich immunoassay using   chemiluminescence for quantitation. The assay is approved  for measurement of prostate-specific antigen (PSA) in   serum and may be used in conjunction with a digital rectal  examination in men 50 years and older as an aid in   detection of prostate cancer.   5-Alpha-reductase inhibitors (e.g. Proscar, Finasteride,   Avodart, Dutasteride and Trudy) for the treatment of BPH   have been shown to lower PSA levels by an average of 50%   after 6 months of treatment.       Activities of Daily Living:   Is your overall impression that this patient is benefiting (symptom reduction outweighs side effects) from testosterone therapy? Yes     1. Physical Functioning: Better  2. Family Relationship: Better  3. Social Relationship: Better  4. Mood: Better  5. Sleep Patterns: Better  6. Overall Function: Better      Review of Systems   All other systems reviewed and are negative.      Objective   /82 (BP Location: Right arm, Patient Position: Sitting, BP Cuff Size: Large adult)   Pulse 90   Ht 1.905 m (6' 3\")   Wt 120 kg (265 lb)   SpO2 97%   BMI 33.12 kg/m²     Physical Exam  Constitutional:       Appearance: Normal appearance.   HENT:      Head: Normocephalic and atraumatic.      Right Ear: Tympanic membrane and ear canal normal.      Left Ear: Tympanic membrane and ear canal normal.      Mouth/Throat:      Mouth: Mucous membranes are moist.      " Pharynx: Oropharynx is clear.   Eyes:      Extraocular Movements: Extraocular movements intact.      Conjunctiva/sclera: Conjunctivae normal.      Pupils: Pupils are equal, round, and reactive to light.   Cardiovascular:      Rate and Rhythm: Normal rate and regular rhythm.      Pulses: Normal pulses.      Heart sounds: Normal heart sounds.   Pulmonary:      Effort: Pulmonary effort is normal.      Breath sounds: Normal breath sounds.   Abdominal:      General: Abdomen is flat. Bowel sounds are normal.      Palpations: Abdomen is soft.   Musculoskeletal:         General: Normal range of motion.      Cervical back: Normal range of motion and neck supple.   Skin:     General: Skin is warm and dry.      Capillary Refill: Capillary refill takes 2 to 3 seconds.   Neurological:      General: No focal deficit present.      Mental Status: He is alert and oriented to person, place, and time. Mental status is at baseline.   Psychiatric:         Mood and Affect: Mood normal.         Behavior: Behavior normal.         Thought Content: Thought content normal.         Judgment: Judgment normal.         Assessment/Plan   1. Dyslipidemia  stable    2. Type 2 diabetes mellitus without complication, with long-term current use of insulin (CMS/HCC)  - POCT Glycosylated Hemoglobin (HGB A1C) docked device  - Comprehensive Metabolic Panel    3. Type 2 diabetes mellitus with hyperglycemia (CMS/HCC)  On jardiance, poor diet, wants to fix diet  - A1c 9.1 poor diet over holidays  - metFORMIN (Glucophage) 1,000 mg tablet; Take 1 tablet (1,000 mg) by mouth 2 times a day.  Dispense: 180 tablet; Refill: 2    4. Hypogonadism in male  - doing well, oarrs reviewed  - csa on file  - CBC and Auto Differential  - Prostate Specific Antigen, Screen  - Testosterone

## 2024-01-24 NOTE — PROGRESS NOTES
Subjective   Reason for Visit: Nato Callejas is an 58 y.o. male here for a Medicare Wellness visit.               HPI    Patient Care Team:  Frank Blanc DO as PCP - General  Frank Blanc DO as PCP - Zaynab ALMONTE PCP     Review of Systems    Objective   Vitals:  There were no vitals taken for this visit.      Physical Exam    Assessment/Plan   Problem List Items Addressed This Visit    None

## 2024-03-20 ENCOUNTER — PATIENT MESSAGE (OUTPATIENT)
Dept: PRIMARY CARE | Facility: CLINIC | Age: 59
End: 2024-03-20
Payer: COMMERCIAL

## 2024-03-20 DIAGNOSIS — E29.1 HYPOGONADISM IN MALE: ICD-10-CM

## 2024-03-20 RX ORDER — TESTOSTERONE 10 MG/.5G
6 GEL, METERED TOPICAL EVERY MORNING
Qty: 60 G | Refills: 3 | Status: SHIPPED | OUTPATIENT
Start: 2024-03-20 | End: 2024-04-23 | Stop reason: SDUPTHER

## 2024-04-23 ENCOUNTER — OFFICE VISIT (OUTPATIENT)
Dept: PRIMARY CARE | Facility: CLINIC | Age: 59
End: 2024-04-23
Payer: COMMERCIAL

## 2024-04-23 VITALS
HEART RATE: 80 BPM | SYSTOLIC BLOOD PRESSURE: 122 MMHG | OXYGEN SATURATION: 96 % | BODY MASS INDEX: 34.37 KG/M2 | WEIGHT: 275 LBS | DIASTOLIC BLOOD PRESSURE: 80 MMHG

## 2024-04-23 DIAGNOSIS — E11.65 TYPE 2 DIABETES MELLITUS WITH HYPERGLYCEMIA (MULTI): ICD-10-CM

## 2024-04-23 DIAGNOSIS — E11.9 TYPE 2 DIABETES MELLITUS WITHOUT COMPLICATION, WITHOUT LONG-TERM CURRENT USE OF INSULIN (MULTI): ICD-10-CM

## 2024-04-23 DIAGNOSIS — E29.1 HYPOGONADISM IN MALE: ICD-10-CM

## 2024-04-23 LAB
EST. AVERAGE GLUCOSE BLD GHB EST-MCNC: 171 MG/DL
HBA1C MFR BLD: 7.6 %

## 2024-04-23 PROCEDURE — 99214 OFFICE O/P EST MOD 30 MIN: CPT | Performed by: STUDENT IN AN ORGANIZED HEALTH CARE EDUCATION/TRAINING PROGRAM

## 2024-04-23 PROCEDURE — 1036F TOBACCO NON-USER: CPT | Performed by: STUDENT IN AN ORGANIZED HEALTH CARE EDUCATION/TRAINING PROGRAM

## 2024-04-23 PROCEDURE — 84402 ASSAY OF FREE TESTOSTERONE: CPT

## 2024-04-23 PROCEDURE — 36415 COLL VENOUS BLD VENIPUNCTURE: CPT

## 2024-04-23 PROCEDURE — 3074F SYST BP LT 130 MM HG: CPT | Performed by: STUDENT IN AN ORGANIZED HEALTH CARE EDUCATION/TRAINING PROGRAM

## 2024-04-23 PROCEDURE — 3079F DIAST BP 80-89 MM HG: CPT | Performed by: STUDENT IN AN ORGANIZED HEALTH CARE EDUCATION/TRAINING PROGRAM

## 2024-04-23 PROCEDURE — 3046F HEMOGLOBIN A1C LEVEL >9.0%: CPT | Performed by: STUDENT IN AN ORGANIZED HEALTH CARE EDUCATION/TRAINING PROGRAM

## 2024-04-23 PROCEDURE — 83036 HEMOGLOBIN GLYCOSYLATED A1C: CPT

## 2024-04-23 RX ORDER — TESTOSTERONE 10 MG/.5G
6 GEL, METERED TOPICAL EVERY MORNING
Qty: 60 G | Refills: 3 | Status: SHIPPED | OUTPATIENT
Start: 2024-04-23

## 2024-04-23 RX ORDER — METFORMIN HYDROCHLORIDE 1000 MG/1
1000 TABLET ORAL 2 TIMES DAILY
Qty: 180 TABLET | Refills: 2 | Status: SHIPPED | OUTPATIENT
Start: 2024-04-23

## 2024-04-23 ASSESSMENT — PATIENT HEALTH QUESTIONNAIRE - PHQ9
2. FEELING DOWN, DEPRESSED OR HOPELESS: NOT AT ALL
SUM OF ALL RESPONSES TO PHQ9 QUESTIONS 1 AND 2: 0
1. LITTLE INTEREST OR PLEASURE IN DOING THINGS: NOT AT ALL

## 2024-04-23 NOTE — PROGRESS NOTES
Subjective   Reason for Visit: Nato Callejas is an 58 y.o. male here for a Medicare Wellness visit.          Reviewed all medications by prescribing practitioner or clinical pharmacist (such as prescriptions, OTCs, herbal therapies and supplements) and documented in the medical record.    HPI    DM; Type 2, currently maintained on Metformin. Last A1c was 6.1. Patient gets yearly eye exams. Patient does take home BS. Average BS are mid 100s. Denies any lows BS, he has been doing well with diet, started going to the gym, doing well     Low Testosterone: has low testosterone in the past, was noted to be 180 in the past, ED, fatigue, achiness, joint pains, kept declining, was decided to start on testosterone and noticed a huge difference within 4 months. Denies urinary issues.  up to 6 pumps     OARRS:  No data recorded  I have personally reviewed the OARRS report for Nato Callejas. I have considered the risks of abuse, dependence, addiction and diversion    Is the patient prescribed a combination of a benzodiazepine and opioid?  No    Last Urine Drug Screen / ordered today: No  No results found for this or any previous visit (from the past 8760 hour(s)).  Results are as expected.     Clinical rationale for not completing a Urine Drug Screen: Patient prescribed only an antidiarrheal, anorexiant, or testosterone      Controlled Substance Agreement:  Date of the Last Agreement: 4/23  Reviewed Controlled Substance Agreement including but not limited to the benefits, risks, and alternatives to treatment with a Controlled Substance medication(s).    Testosterone:  What is the patient's goal of therapy? Testosterone replacement  Is this being achieved with current treatment? yes    I attest the patient does not have: Breast Cancer, Polycythemia, or Prostate Cancer    Last Testosterone check:  Testosterone, Free   Date Value Ref Range Status   12/09/2022 52.8 35.0 - 155.0 pg/mL Final     Comment:     This test was  "developed and its analytical performance  characteristics have been determined by [facility] [place], [place]. It has  not been cleared or approved by the U.S. Food and Drug  Administration. This assay has been validated pursuant  to the CLIA regulations and is used for clinical  purposes.       Testosterone   Date Value Ref Range Status   01/23/2024 135 (L) 240 - 1,000 ng/dL Final     Testosterone, Total, LC-MS/MS   Date Value Ref Range Status   12/09/2022 264 250 - 1,100 ng/dL Final     Comment:     For additional information, please refer to  http://education.[employer]/faq/  [email or web address]  (This link is being provided for informational/  educational purposes only.)     This test was developed and its analytical performance  characteristics have been determined by [facility] [place], [place]. It has  not been cleared or approved by the U.S. Food and Drug  Administration. This assay has been validated pursuant  to the CLIA regulations and is used for clinical  purposes.         Last CBC:    No results found for: \"CBCDIF\", \"BMCBC\", \"PR1\"    Last PSA:   PSA   Date Value Ref Range Status   12/09/2022 1.28 0.00 - 4.00 ng/mL Final     Comment:     The FDA requires that the method used for PSA assay be   reported to the physician. Values obtained with different   assay methods must not be used interchangeably. This test   was performed at [facility] using the Siemens  Atellica PSA method, which is a sandwich immunoassay using   chemiluminescence for quantitation. The assay is approved  for measurement of prostate-specific antigen (PSA) in   serum and may be used in conjunction with a digital rectal  examination in men 50 years and older as an aid in   detection of prostate cancer.   5-Alpha-reductase inhibitors (e.g. Proscar, Finasteride,   Avodart, Dutasteride and Trudy) for the treatment of BPH   have been shown to lower PSA " levels by an average of 50%   after 6 months of treatment.       Prostate Specific Antigen,Screen   Date Value Ref Range Status   01/23/2024 1.80 <=4.00 ng/mL Final       Activities of Daily Living:   Is your overall impression that this patient is benefiting (symptom reduction outweighs side effects) from testosterone therapy? Yes     1. Physical Functioning: Better  2. Family Relationship: Better  3. Social Relationship: Better  4. Mood: Better  5. Sleep Patterns: Better  6. Overall Function: Better    Patient Care Team:  Frank Blanc DO as PCP - General  Frank Blanc DO as PCP - Zaynab ALMONTE PCP     Review of Systems    Objective   Vitals:  /80 (BP Location: Right arm, Patient Position: Sitting)   Pulse 80   Wt 125 kg (275 lb)   SpO2 96%   BMI 34.37 kg/m²       Physical Exam    Assessment/Plan   Problem List Items Addressed This Visit       Diabetes mellitus (Multi)    Relevant Medications    empagliflozin (Jardiance) 25 mg    Other Relevant Orders    Hemoglobin A1C    Hypogonadism in male    Relevant Medications    testosterone (Fortesta) 10 mg/0.5 gram /actuation gel in metered-dose pump gel    Other Relevant Orders    Testosterone, total and free     Other Visit Diagnoses       Type 2 diabetes mellitus with hyperglycemia (Multi)        Relevant Medications    metFORMIN (Glucophage) 1,000 mg tablet          1. Type 2 diabetes mellitus without complication, without long-term current use of insulin (Multi)  Stable check A1c  - empagliflozin (Jardiance) 25 mg; Take 1 tablet (25 mg) by mouth once daily.  Dispense: 90 tablet; Refill: 1  - Hemoglobin A1C    2. Type 2 diabetes mellitus with hyperglycemia (Multi)    - metFORMIN (Glucophage) 1,000 mg tablet; Take 1 tablet (1,000 mg) by mouth 2 times a day.  Dispense: 180 tablet; Refill: 2    3. Hypogonadism in male   Refill given  Oarrs seen  CSA on file  Check labs  - testosterone (Fortesta) 10 mg/0.5 gram /actuation gel in metered-dose pump gel; Place 6 Pump on  the skin once daily in the morning.  Dispense: 60 g; Refill: 3  - Testosterone, total and free

## 2024-04-27 LAB
TESTOSTERONE FREE (CHAN): 63.7 PG/ML (ref 35–155)
TESTOSTERONE,TOTAL,LC-MS/MS: 287 NG/DL (ref 250–1100)

## 2024-08-02 DIAGNOSIS — E29.1 HYPOGONADISM IN MALE: ICD-10-CM

## 2024-08-05 RX ORDER — TESTOSTERONE 10 MG/.5G
6 GEL, METERED TOPICAL EVERY MORNING
Qty: 60 G | Refills: 3 | Status: SHIPPED | OUTPATIENT
Start: 2024-08-05

## 2024-08-12 ENCOUNTER — TELEPHONE (OUTPATIENT)
Dept: PRIMARY CARE | Facility: CLINIC | Age: 59
End: 2024-08-12
Payer: COMMERCIAL

## 2024-08-12 NOTE — TELEPHONE ENCOUNTER
testosterone (Fortesta) 10 mg/0.5 gram /actuation gel in metered-dose pump gel [[id number]]    Is out of stock at pharmacy    Alternative ???

## 2024-08-28 NOTE — TELEPHONE ENCOUNTER
Called pharmacy and they are going to prescribe testertone gel 1.62% = 3 pumps will be equivalent   I said OK whatever alt Is in stock

## 2024-10-29 ENCOUNTER — APPOINTMENT (OUTPATIENT)
Dept: PRIMARY CARE | Facility: CLINIC | Age: 59
End: 2024-10-29
Payer: COMMERCIAL

## 2024-10-31 ENCOUNTER — OFFICE VISIT (OUTPATIENT)
Dept: PRIMARY CARE | Facility: CLINIC | Age: 59
End: 2024-10-31
Payer: COMMERCIAL

## 2024-10-31 VITALS
HEART RATE: 89 BPM | SYSTOLIC BLOOD PRESSURE: 122 MMHG | WEIGHT: 265 LBS | BODY MASS INDEX: 32.95 KG/M2 | OXYGEN SATURATION: 96 % | DIASTOLIC BLOOD PRESSURE: 80 MMHG | HEIGHT: 75 IN

## 2024-10-31 DIAGNOSIS — E11.9 TYPE 2 DIABETES MELLITUS WITHOUT COMPLICATION, WITH LONG-TERM CURRENT USE OF INSULIN (MULTI): ICD-10-CM

## 2024-10-31 DIAGNOSIS — E29.1 HYPOGONADISM IN MALE: ICD-10-CM

## 2024-10-31 DIAGNOSIS — Z79.4 TYPE 2 DIABETES MELLITUS WITHOUT COMPLICATION, WITH LONG-TERM CURRENT USE OF INSULIN (MULTI): ICD-10-CM

## 2024-10-31 DIAGNOSIS — E11.65 TYPE 2 DIABETES MELLITUS WITH HYPERGLYCEMIA (MULTI): ICD-10-CM

## 2024-10-31 DIAGNOSIS — E11.9 TYPE 2 DIABETES MELLITUS WITHOUT COMPLICATION, WITHOUT LONG-TERM CURRENT USE OF INSULIN (MULTI): ICD-10-CM

## 2024-10-31 LAB — HBA1C MFR BLD: 7.7 % (ref 4.2–6.5)

## 2024-10-31 PROCEDURE — 83036 HEMOGLOBIN GLYCOSYLATED A1C: CPT | Mod: CLIA WAIVED TEST | Performed by: STUDENT IN AN ORGANIZED HEALTH CARE EDUCATION/TRAINING PROGRAM

## 2024-10-31 PROCEDURE — 3074F SYST BP LT 130 MM HG: CPT | Performed by: STUDENT IN AN ORGANIZED HEALTH CARE EDUCATION/TRAINING PROGRAM

## 2024-10-31 PROCEDURE — 3051F HG A1C>EQUAL 7.0%<8.0%: CPT | Performed by: STUDENT IN AN ORGANIZED HEALTH CARE EDUCATION/TRAINING PROGRAM

## 2024-10-31 PROCEDURE — 1036F TOBACCO NON-USER: CPT | Performed by: STUDENT IN AN ORGANIZED HEALTH CARE EDUCATION/TRAINING PROGRAM

## 2024-10-31 PROCEDURE — 3008F BODY MASS INDEX DOCD: CPT | Performed by: STUDENT IN AN ORGANIZED HEALTH CARE EDUCATION/TRAINING PROGRAM

## 2024-10-31 PROCEDURE — 99214 OFFICE O/P EST MOD 30 MIN: CPT | Performed by: STUDENT IN AN ORGANIZED HEALTH CARE EDUCATION/TRAINING PROGRAM

## 2024-10-31 PROCEDURE — 3079F DIAST BP 80-89 MM HG: CPT | Performed by: STUDENT IN AN ORGANIZED HEALTH CARE EDUCATION/TRAINING PROGRAM

## 2024-10-31 RX ORDER — SILDENAFIL 100 MG/1
100 TABLET, FILM COATED ORAL
Qty: 10 TABLET | Refills: 3 | Status: SHIPPED | OUTPATIENT
Start: 2024-10-31

## 2024-10-31 RX ORDER — GLIPIZIDE 5 MG/1
5 TABLET ORAL
Qty: 60 TABLET | Refills: 11 | Status: SHIPPED | OUTPATIENT
Start: 2024-10-31 | End: 2025-10-31

## 2024-10-31 RX ORDER — METFORMIN HYDROCHLORIDE 1000 MG/1
1000 TABLET ORAL 2 TIMES DAILY
Qty: 180 TABLET | Refills: 2 | Status: SHIPPED | OUTPATIENT
Start: 2024-10-31

## 2024-10-31 ASSESSMENT — PATIENT HEALTH QUESTIONNAIRE - PHQ9
2. FEELING DOWN, DEPRESSED OR HOPELESS: NOT AT ALL
SUM OF ALL RESPONSES TO PHQ9 QUESTIONS 1 AND 2: 0
1. LITTLE INTEREST OR PLEASURE IN DOING THINGS: NOT AT ALL
2. FEELING DOWN, DEPRESSED OR HOPELESS: NOT AT ALL
SUM OF ALL RESPONSES TO PHQ9 QUESTIONS 1 AND 2: 0
1. LITTLE INTEREST OR PLEASURE IN DOING THINGS: NOT AT ALL

## 2024-10-31 ASSESSMENT — ENCOUNTER SYMPTOMS: DEPRESSION: 0

## 2025-03-04 DIAGNOSIS — E11.65 TYPE 2 DIABETES MELLITUS WITH HYPERGLYCEMIA (MULTI): ICD-10-CM

## 2025-03-04 RX ORDER — METFORMIN HYDROCHLORIDE 1000 MG/1
1000 TABLET ORAL 2 TIMES DAILY
Qty: 180 TABLET | Refills: 2 | Status: SHIPPED | OUTPATIENT
Start: 2025-03-04

## 2025-04-08 DIAGNOSIS — E29.1 HYPOGONADISM IN MALE: ICD-10-CM

## 2025-04-09 RX ORDER — TESTOSTERONE 20.25 MG/1.25G
GEL TOPICAL
Qty: 75 G | Refills: 3 | Status: SHIPPED | OUTPATIENT
Start: 2025-04-09

## 2025-05-01 ENCOUNTER — APPOINTMENT (OUTPATIENT)
Dept: PRIMARY CARE | Facility: CLINIC | Age: 60
End: 2025-05-01
Payer: COMMERCIAL

## 2025-05-01 VITALS
DIASTOLIC BLOOD PRESSURE: 88 MMHG | WEIGHT: 280 LBS | HEART RATE: 89 BPM | SYSTOLIC BLOOD PRESSURE: 136 MMHG | BODY MASS INDEX: 35 KG/M2 | OXYGEN SATURATION: 98 %

## 2025-05-01 DIAGNOSIS — Z79.4 TYPE 2 DIABETES MELLITUS WITHOUT COMPLICATION, WITH LONG-TERM CURRENT USE OF INSULIN: ICD-10-CM

## 2025-05-01 DIAGNOSIS — E29.1 HYPOGONADISM IN MALE: ICD-10-CM

## 2025-05-01 DIAGNOSIS — E11.65 TYPE 2 DIABETES MELLITUS WITH HYPERGLYCEMIA (MULTI): ICD-10-CM

## 2025-05-01 DIAGNOSIS — E11.9 TYPE 2 DIABETES MELLITUS WITHOUT COMPLICATION, WITH LONG-TERM CURRENT USE OF INSULIN: ICD-10-CM

## 2025-05-01 LAB — HBA1C MFR BLD: 8.6 % (ref 4.2–6.5)

## 2025-05-01 PROCEDURE — 83036 HEMOGLOBIN GLYCOSYLATED A1C: CPT | Mod: CLIA WAIVED TEST | Performed by: STUDENT IN AN ORGANIZED HEALTH CARE EDUCATION/TRAINING PROGRAM

## 2025-05-01 PROCEDURE — 1036F TOBACCO NON-USER: CPT | Performed by: STUDENT IN AN ORGANIZED HEALTH CARE EDUCATION/TRAINING PROGRAM

## 2025-05-01 PROCEDURE — 3075F SYST BP GE 130 - 139MM HG: CPT | Performed by: STUDENT IN AN ORGANIZED HEALTH CARE EDUCATION/TRAINING PROGRAM

## 2025-05-01 PROCEDURE — 3052F HG A1C>EQUAL 8.0%<EQUAL 9.0%: CPT | Performed by: STUDENT IN AN ORGANIZED HEALTH CARE EDUCATION/TRAINING PROGRAM

## 2025-05-01 PROCEDURE — 99214 OFFICE O/P EST MOD 30 MIN: CPT | Performed by: STUDENT IN AN ORGANIZED HEALTH CARE EDUCATION/TRAINING PROGRAM

## 2025-05-01 PROCEDURE — 3079F DIAST BP 80-89 MM HG: CPT | Performed by: STUDENT IN AN ORGANIZED HEALTH CARE EDUCATION/TRAINING PROGRAM

## 2025-05-01 RX ORDER — GLIPIZIDE 5 MG/1
5 TABLET ORAL
Qty: 180 TABLET | Refills: 2 | Status: SHIPPED | OUTPATIENT
Start: 2025-05-01 | End: 2026-05-01

## 2025-05-01 RX ORDER — TESTOSTERONE 20.25 MG/1.25G
3 GEL TOPICAL DAILY
Qty: 75 G | Refills: 3 | Status: SHIPPED | OUTPATIENT
Start: 2025-05-01

## 2025-05-01 RX ORDER — METFORMIN HYDROCHLORIDE 1000 MG/1
1000 TABLET ORAL 2 TIMES DAILY
Qty: 180 TABLET | Refills: 2 | Status: SHIPPED | OUTPATIENT
Start: 2025-05-01

## 2025-05-01 ASSESSMENT — PATIENT HEALTH QUESTIONNAIRE - PHQ9
1. LITTLE INTEREST OR PLEASURE IN DOING THINGS: NOT AT ALL
2. FEELING DOWN, DEPRESSED OR HOPELESS: NOT AT ALL
SUM OF ALL RESPONSES TO PHQ9 QUESTIONS 1 AND 2: 0
1. LITTLE INTEREST OR PLEASURE IN DOING THINGS: NOT AT ALL
SUM OF ALL RESPONSES TO PHQ9 QUESTIONS 1 AND 2: 0
2. FEELING DOWN, DEPRESSED OR HOPELESS: NOT AT ALL

## 2025-05-01 ASSESSMENT — ENCOUNTER SYMPTOMS: DEPRESSION: 0

## 2025-05-01 NOTE — PROGRESS NOTES
Subjective   Patient ID: Nato Callejas is a 59 y.o. male who presents for Follow-up (6 months ).    HPI         Recent fall in December on Ice at work fell on his hand and tore ligaments, going through workers comp. Has gotten multiple steroid injections, recent divorce, some increased stress    DM; Type 2, currently maintained on Metformin. Last A1c was 6.1. Patient gets yearly eye exams. Patient does take home BS. Average BS are mid 100s. Denies any lows BS, he has been doing well with diet, started going to the gym, doing well     Low Testosterone: has low testosterone in the past, was noted to be 180 in the past, ED, fatigue, achiness, joint pains, kept declining, was decided to start on testosterone and noticed a huge difference within 4 months. Denies urinary issues.  up to 6 pumps     OARRS:  No data recorded  I have personally reviewed the OARRS report for Nato Callejas. I have considered the risks of abuse, dependence, addiction and diversion    Is the patient prescribed a combination of a benzodiazepine and opioid?  No    Last Urine Drug Screen / ordered today: No  No results found for this or any previous visit (from the past 8760 hours).  Results are as expected.     Clinical rationale for not completing a Urine Drug Screen: Patient prescribed only an antidiarrheal, anorexiant, or testosterone      Controlled Substance Agreement:  Date of the Last Agreement: 2025  Reviewed Controlled Substance Agreement including but not limited to the benefits, risks, and alternatives to treatment with a Controlled Substance medication(s).    Testosterone:  What is the patient's goal of therapy? Increas testosterone  Is this being achieved with current treatment? yes    I attest the patient does not have: Breast Cancer, Polycythemia, or Prostate Cancer    Last Testosterone check:  Testosterone, Free   Date Value Ref Range Status   04/23/2024 63.7 35.0 - 155.0 pg/mL Final     Comment:        This test was developed  "and its analytical performance  characteristics have been determined by [facility] [place], [place]. It has  not been cleared or approved by the U.S. Food and Drug  Administration. This assay has been validated pursuant  to the CLIA regulations and is used for clinical  purposes.            Testosterone   Date Value Ref Range Status   01/23/2024 135 (L) 240 - 1,000 ng/dL Final     Testosterone, Total, LC-MS/MS   Date Value Ref Range Status   04/23/2024 287 250 - 1100 ng/dL Final     Comment:        For additional information, please refer to  http://education.[employer]/faq/  [id number]  (This link is being provided for informational/  educational purposes only.)     This test was developed and its analytical performance  characteristics have been determined by [facility] [place], [place]. It has  not been cleared or approved by the U.S. Food and Drug  Administration. This assay has been validated pursuant  to the CLIA regulations and is used for clinical  purposes.              Last CBC:    No results found for: \"CBCDIF\", \"BMCBC\", \"PR1\"    Last PSA:   PSA   Date Value Ref Range Status   12/09/2022 1.28 0.00 - 4.00 ng/mL Final     Comment:     The FDA requires that the method used for PSA assay be   reported to the physician. Values obtained with different   assay methods must not be used interchangeably. This test   was performed at [facility] using the Siemens  Atellica PSA method, which is a sandwich immunoassay using   chemiluminescence for quantitation. The assay is approved  for measurement of prostate-specific antigen (PSA) in   serum and may be used in conjunction with a digital rectal  examination in men 50 years and older as an aid in   detection of prostate cancer.   5-Alpha-reductase inhibitors (e.g. Proscar, Finasteride,   Avodart, Dutasteride and Trudy) for the treatment of BPH   have been shown to lower PSA " levels by an average of 50%   after 6 months of treatment.       Prostate Specific Antigen,Screen   Date Value Ref Range Status   01/23/2024 1.80 <=4.00 ng/mL Final       Activities of Daily Living:   Is your overall impression that this patient is benefiting (symptom reduction outweighs side effects) from testosterone therapy? Yes     1. Physical Functioning: Better  2. Family Relationship: Better  3. Social Relationship: Better  4. Mood: Better  5. Sleep Patterns: Better  6. Overall Function: Better      Review of Systems   All other systems reviewed and are negative.      Objective   /88 (BP Location: Left arm, Patient Position: Sitting, BP Cuff Size: Large adult)   Pulse 89   Wt 127 kg (280 lb)   SpO2 98%   BMI 35.00 kg/m²     Physical Exam  Constitutional:       Appearance: Normal appearance.   HENT:      Head: Normocephalic and atraumatic.      Right Ear: Tympanic membrane and ear canal normal.      Left Ear: Tympanic membrane and ear canal normal.      Mouth/Throat:      Mouth: Mucous membranes are moist.      Pharynx: Oropharynx is clear.   Eyes:      Extraocular Movements: Extraocular movements intact.      Conjunctiva/sclera: Conjunctivae normal.      Pupils: Pupils are equal, round, and reactive to light.   Cardiovascular:      Rate and Rhythm: Normal rate and regular rhythm.      Pulses: Normal pulses.      Heart sounds: Normal heart sounds.   Pulmonary:      Effort: Pulmonary effort is normal.      Breath sounds: Normal breath sounds.   Abdominal:      General: Abdomen is flat. Bowel sounds are normal.      Palpations: Abdomen is soft.   Musculoskeletal:         General: Normal range of motion.      Cervical back: Normal range of motion and neck supple.   Skin:     General: Skin is warm and dry.      Capillary Refill: Capillary refill takes 2 to 3 seconds.   Neurological:      General: No focal deficit present.      Mental Status: He is alert and oriented to person, place, and time. Mental  status is at baseline.   Psychiatric:         Mood and Affect: Mood normal.         Behavior: Behavior normal.         Thought Content: Thought content normal.         Judgment: Judgment normal.       Assessment/Plan   1. Type 2 diabetes mellitus without complication, with long-term current use of insulin  Will work to get it back down transiently elevated fro m6 cortisone shots  - POCT Glycosylated Hemoglobin (HGB A1C) docked device  - glipiZIDE (Glucotrol) 5 mg tablet; Take 1 tablet (5 mg) by mouth 2 times a day before meals.  Dispense: 180 tablet; Refill: 2    2. Type 2 diabetes mellitus with hyperglycemia (Multi)    - metFORMIN (Glucophage) 1,000 mg tablet; Take 1 tablet (1,000 mg) by mouth 2 times a day.  Dispense: 180 tablet; Refill: 2    3. Hypogonadism in male    - testosterone 20.25 mg/1.25 gram (1.62 %) gel in metered-dose pump; Place 3 Pump on the skin once daily.  Dispense: 75 g; Refill: 3  - Comprehensive Metabolic Panel  - CBC and Auto Differential  - Testosterone, total and free  d

## 2025-05-02 LAB
ALBUMIN SERPL-MCNC: 4.6 G/DL (ref 3.6–5.1)
ALP SERPL-CCNC: 39 U/L (ref 35–144)
ALT SERPL-CCNC: 63 U/L (ref 9–46)
ANION GAP SERPL CALCULATED.4IONS-SCNC: 12 MMOL/L (CALC) (ref 7–17)
AST SERPL-CCNC: 37 U/L (ref 10–35)
BASOPHILS # BLD AUTO: 10 CELLS/UL (ref 0–200)
BASOPHILS NFR BLD AUTO: 0.2 %
BILIRUB SERPL-MCNC: 0.5 MG/DL (ref 0.2–1.2)
BUN SERPL-MCNC: 12 MG/DL (ref 7–25)
CALCIUM SERPL-MCNC: 9.3 MG/DL (ref 8.6–10.3)
CHLORIDE SERPL-SCNC: 102 MMOL/L (ref 98–110)
CO2 SERPL-SCNC: 23 MMOL/L (ref 20–32)
CREAT SERPL-MCNC: 0.93 MG/DL (ref 0.7–1.3)
EGFRCR SERPLBLD CKD-EPI 2021: 96 ML/MIN/1.73M2
EOSINOPHIL # BLD AUTO: 60 CELLS/UL (ref 15–500)
EOSINOPHIL NFR BLD AUTO: 1.2 %
ERYTHROCYTE [DISTWIDTH] IN BLOOD BY AUTOMATED COUNT: 14 % (ref 11–15)
GLUCOSE SERPL-MCNC: 181 MG/DL (ref 65–99)
HCT VFR BLD AUTO: 47.1 % (ref 38.5–50)
HGB BLD-MCNC: 15.9 G/DL (ref 13.2–17.1)
LYMPHOCYTES # BLD AUTO: 1345 CELLS/UL (ref 850–3900)
LYMPHOCYTES NFR BLD AUTO: 26.9 %
MCH RBC QN AUTO: 31.7 PG (ref 27–33)
MCHC RBC AUTO-ENTMCNC: 33.8 G/DL (ref 32–36)
MCV RBC AUTO: 94 FL (ref 80–100)
MONOCYTES # BLD AUTO: 290 CELLS/UL (ref 200–950)
MONOCYTES NFR BLD AUTO: 5.8 %
NEUTROPHILS # BLD AUTO: 3295 CELLS/UL (ref 1500–7800)
NEUTROPHILS NFR BLD AUTO: 65.9 %
PLATELET # BLD AUTO: 191 THOUSAND/UL (ref 140–400)
PMV BLD REES-ECKER: 10.8 FL (ref 7.5–12.5)
POTASSIUM SERPL-SCNC: 5.1 MMOL/L (ref 3.5–5.3)
PROT SERPL-MCNC: 7.3 G/DL (ref 6.1–8.1)
RBC # BLD AUTO: 5.01 MILLION/UL (ref 4.2–5.8)
SODIUM SERPL-SCNC: 137 MMOL/L (ref 135–146)
TESTOST FREE SERPL-MCNC: NORMAL PG/ML
TESTOST SERPL-MCNC: NORMAL NG/DL
WBC # BLD AUTO: 5 THOUSAND/UL (ref 3.8–10.8)

## 2025-05-07 LAB
ALBUMIN SERPL-MCNC: 4.6 G/DL (ref 3.6–5.1)
ALP SERPL-CCNC: 39 U/L (ref 35–144)
ALT SERPL-CCNC: 63 U/L (ref 9–46)
ANION GAP SERPL CALCULATED.4IONS-SCNC: 12 MMOL/L (CALC) (ref 7–17)
AST SERPL-CCNC: 37 U/L (ref 10–35)
BASOPHILS # BLD AUTO: 10 CELLS/UL (ref 0–200)
BASOPHILS NFR BLD AUTO: 0.2 %
BILIRUB SERPL-MCNC: 0.5 MG/DL (ref 0.2–1.2)
BUN SERPL-MCNC: 12 MG/DL (ref 7–25)
CALCIUM SERPL-MCNC: 9.3 MG/DL (ref 8.6–10.3)
CHLORIDE SERPL-SCNC: 102 MMOL/L (ref 98–110)
CO2 SERPL-SCNC: 23 MMOL/L (ref 20–32)
CREAT SERPL-MCNC: 0.93 MG/DL (ref 0.7–1.3)
EGFRCR SERPLBLD CKD-EPI 2021: 96 ML/MIN/1.73M2
EOSINOPHIL # BLD AUTO: 60 CELLS/UL (ref 15–500)
EOSINOPHIL NFR BLD AUTO: 1.2 %
ERYTHROCYTE [DISTWIDTH] IN BLOOD BY AUTOMATED COUNT: 14 % (ref 11–15)
GLUCOSE SERPL-MCNC: 181 MG/DL (ref 65–99)
HCT VFR BLD AUTO: 47.1 % (ref 38.5–50)
HGB BLD-MCNC: 15.9 G/DL (ref 13.2–17.1)
LYMPHOCYTES # BLD AUTO: 1345 CELLS/UL (ref 850–3900)
LYMPHOCYTES NFR BLD AUTO: 26.9 %
MCH RBC QN AUTO: 31.7 PG (ref 27–33)
MCHC RBC AUTO-ENTMCNC: 33.8 G/DL (ref 32–36)
MCV RBC AUTO: 94 FL (ref 80–100)
MONOCYTES # BLD AUTO: 290 CELLS/UL (ref 200–950)
MONOCYTES NFR BLD AUTO: 5.8 %
NEUTROPHILS # BLD AUTO: 3295 CELLS/UL (ref 1500–7800)
NEUTROPHILS NFR BLD AUTO: 65.9 %
PLATELET # BLD AUTO: 191 THOUSAND/UL (ref 140–400)
PMV BLD REES-ECKER: 10.8 FL (ref 7.5–12.5)
POTASSIUM SERPL-SCNC: 5.1 MMOL/L (ref 3.5–5.3)
PROT SERPL-MCNC: 7.3 G/DL (ref 6.1–8.1)
RBC # BLD AUTO: 5.01 MILLION/UL (ref 4.2–5.8)
SODIUM SERPL-SCNC: 137 MMOL/L (ref 135–146)
TESTOST FREE SERPL-MCNC: 57.9 PG/ML (ref 35–155)
TESTOST SERPL-MCNC: 273 NG/DL (ref 250–1100)
WBC # BLD AUTO: 5 THOUSAND/UL (ref 3.8–10.8)

## 2025-08-06 DIAGNOSIS — Z12.12 SCREENING FOR COLORECTAL CANCER: ICD-10-CM

## 2025-08-06 DIAGNOSIS — Z12.11 SCREENING FOR COLORECTAL CANCER: ICD-10-CM

## 2025-08-08 ENCOUNTER — APPOINTMENT (OUTPATIENT)
Dept: PRIMARY CARE | Facility: CLINIC | Age: 60
End: 2025-08-08
Payer: COMMERCIAL

## 2025-08-08 VITALS
BODY MASS INDEX: 35.62 KG/M2 | HEART RATE: 86 BPM | DIASTOLIC BLOOD PRESSURE: 82 MMHG | OXYGEN SATURATION: 98 % | WEIGHT: 285 LBS | SYSTOLIC BLOOD PRESSURE: 124 MMHG

## 2025-08-08 DIAGNOSIS — E29.1 HYPOGONADISM IN MALE: ICD-10-CM

## 2025-08-08 DIAGNOSIS — E11.9 TYPE 2 DIABETES MELLITUS WITHOUT COMPLICATION, WITH LONG-TERM CURRENT USE OF INSULIN: ICD-10-CM

## 2025-08-08 DIAGNOSIS — Z79.4 TYPE 2 DIABETES MELLITUS WITHOUT COMPLICATION, WITH LONG-TERM CURRENT USE OF INSULIN: ICD-10-CM

## 2025-08-08 LAB — HBA1C MFR BLD: 8.3 % (ref 4.2–6.5)

## 2025-08-08 RX ORDER — TESTOSTERONE 20.25 MG/1.25G
3 GEL TOPICAL DAILY
Qty: 75 G | Refills: 3 | Status: SHIPPED | OUTPATIENT
Start: 2025-08-08

## 2025-08-08 RX ORDER — TIRZEPATIDE 2.5 MG/.5ML
2.5 INJECTION, SOLUTION SUBCUTANEOUS WEEKLY
Qty: 3 ML | Refills: 1 | Status: SHIPPED | OUTPATIENT
Start: 2025-08-08

## 2025-08-08 ASSESSMENT — PATIENT HEALTH QUESTIONNAIRE - PHQ9
SUM OF ALL RESPONSES TO PHQ9 QUESTIONS 1 AND 2: 0
2. FEELING DOWN, DEPRESSED OR HOPELESS: NOT AT ALL
1. LITTLE INTEREST OR PLEASURE IN DOING THINGS: NOT AT ALL

## 2025-08-08 ASSESSMENT — ENCOUNTER SYMPTOMS: DEPRESSION: 0

## 2025-08-08 NOTE — PROGRESS NOTES
Subjective   Patient ID: Nato Callejas is a 59 y.o. male who presents for Diabetes.    HPI   Nato Callejas is a 59 year old male with a history of T2DM presents for follow up.    T2DM: A1c 8.3 today. A1c was 8.6 three months ago. Compliant with glipizide and metformin. Denies vision changes, polyuria, polydipsia.     Low testosterone: Has been on testosterone and doing well. Denies fatigue, joint pains, urinary issues at this time.     Review of Systems  10 point review of systems was completed and was negative other than what was mentioned in the HPI above.     Objective   /82 (BP Location: Left arm, Patient Position: Sitting, BP Cuff Size: Large adult)   Pulse 86   Wt 129 kg (285 lb)   SpO2 98%   BMI 35.62 kg/m²     Physical Exam  General:  Pleasant and cooperative. No apparent distress.  HEENT:  Normocephalic, atraumatic  Chest:  Clear to auscultation bilaterally. No wheezes, rales, or rhonchi.  CV:  Regular rate and rhythm. No murmurs    Abdomen: Abdomen is soft, non-tender, non-distended. BS +   Extremities:  No lower extremity edema or cyanosis.   Neurological:  AAOx3. No focal deficits.  Skin:  Warm and dry.     Assessment/Plan   1. Type 2 diabetes mellitus   - A1c 8.3 today, uncontrolled despited meds  - 20 lb weight gain in past year  - bmi 35, morbidly obese  - it is necessary for him to go on Mounjaro injections weekly for better A1c control and weight loss  - Continue Glipizide 5 mg BID and Metformin 1000 mg BID  - POCT Glycosylated Hemoglobin (HGB A1C) docked device     2. Hypogonadism in male  - Testosterone 273 ng/dL 3 months ago.  - Continue testosterone 20.25 mg/1.25 gram (1.62 %) gel in metered-dose pump; Place 3 Pump on the skin once daily.

## 2025-08-18 ENCOUNTER — TELEPHONE (OUTPATIENT)
Dept: PRIMARY CARE | Facility: CLINIC | Age: 60
End: 2025-08-18
Payer: COMMERCIAL

## 2025-11-11 ENCOUNTER — APPOINTMENT (OUTPATIENT)
Dept: PRIMARY CARE | Facility: CLINIC | Age: 60
End: 2025-11-11
Payer: COMMERCIAL